# Patient Record
Sex: FEMALE | Race: ASIAN | NOT HISPANIC OR LATINO | Employment: FULL TIME | URBAN - METROPOLITAN AREA
[De-identification: names, ages, dates, MRNs, and addresses within clinical notes are randomized per-mention and may not be internally consistent; named-entity substitution may affect disease eponyms.]

---

## 2017-02-02 ENCOUNTER — OFFICE VISIT - HEALTHEAST (OUTPATIENT)
Dept: FAMILY MEDICINE | Facility: CLINIC | Age: 32
End: 2017-02-02

## 2017-02-02 DIAGNOSIS — L91.0 KELOID: ICD-10-CM

## 2017-02-02 DIAGNOSIS — Z76.89 ENCOUNTER TO ESTABLISH CARE: ICD-10-CM

## 2017-02-02 ASSESSMENT — MIFFLIN-ST. JEOR: SCORE: 1220.67

## 2017-02-03 ENCOUNTER — RECORDS - HEALTHEAST (OUTPATIENT)
Dept: ADMINISTRATIVE | Facility: OTHER | Age: 32
End: 2017-02-03

## 2017-08-30 ENCOUNTER — OFFICE VISIT - HEALTHEAST (OUTPATIENT)
Dept: FAMILY MEDICINE | Facility: CLINIC | Age: 32
End: 2017-08-30

## 2017-08-30 DIAGNOSIS — Z23 NEED FOR IMMUNIZATION AGAINST INFLUENZA: ICD-10-CM

## 2017-08-30 DIAGNOSIS — G43.109 MIGRAINE WITH AURA AND WITHOUT STATUS MIGRAINOSUS, NOT INTRACTABLE: ICD-10-CM

## 2017-08-30 ASSESSMENT — MIFFLIN-ST. JEOR: SCORE: 1218.85

## 2017-10-11 ENCOUNTER — OFFICE VISIT - HEALTHEAST (OUTPATIENT)
Dept: FAMILY MEDICINE | Facility: CLINIC | Age: 32
End: 2017-10-11

## 2017-10-11 DIAGNOSIS — Z71.84 COUNSELING FOR TRAVEL: ICD-10-CM

## 2017-10-11 DIAGNOSIS — G43.109 MIGRAINE WITH AURA AND WITHOUT STATUS MIGRAINOSUS, NOT INTRACTABLE: ICD-10-CM

## 2017-10-11 ASSESSMENT — MIFFLIN-ST. JEOR: SCORE: 1226.57

## 2017-10-20 ENCOUNTER — COMMUNICATION - HEALTHEAST (OUTPATIENT)
Dept: HEALTH INFORMATION MANAGEMENT | Facility: CLINIC | Age: 32
End: 2017-10-20

## 2017-10-20 ENCOUNTER — COMMUNICATION - HEALTHEAST (OUTPATIENT)
Dept: TELEHEALTH | Facility: CLINIC | Age: 32
End: 2017-10-20

## 2017-12-06 ENCOUNTER — OFFICE VISIT - HEALTHEAST (OUTPATIENT)
Dept: FAMILY MEDICINE | Facility: CLINIC | Age: 32
End: 2017-12-06

## 2017-12-06 ENCOUNTER — COMMUNICATION - HEALTHEAST (OUTPATIENT)
Dept: SCHEDULING | Facility: CLINIC | Age: 32
End: 2017-12-06

## 2017-12-06 DIAGNOSIS — S60.429A BLISTER OF FINGER, INITIAL ENCOUNTER: ICD-10-CM

## 2017-12-06 ASSESSMENT — MIFFLIN-ST. JEOR: SCORE: 1223.4

## 2018-02-26 ENCOUNTER — COMMUNICATION - HEALTHEAST (OUTPATIENT)
Dept: SCHEDULING | Facility: CLINIC | Age: 33
End: 2018-02-26

## 2018-10-31 ENCOUNTER — AMBULATORY - HEALTHEAST (OUTPATIENT)
Dept: NURSING | Facility: CLINIC | Age: 33
End: 2018-10-31

## 2019-05-07 ENCOUNTER — OFFICE VISIT - HEALTHEAST (OUTPATIENT)
Dept: FAMILY MEDICINE | Facility: CLINIC | Age: 34
End: 2019-05-07

## 2019-05-07 ENCOUNTER — RECORDS - HEALTHEAST (OUTPATIENT)
Dept: GENERAL RADIOLOGY | Facility: CLINIC | Age: 34
End: 2019-05-07

## 2019-05-07 DIAGNOSIS — S99.921A TOE INJURY, RIGHT, INITIAL ENCOUNTER: ICD-10-CM

## 2019-05-07 DIAGNOSIS — S99.921A UNSPECIFIED INJURY OF RIGHT FOOT, INITIAL ENCOUNTER: ICD-10-CM

## 2019-05-21 ENCOUNTER — RECORDS - HEALTHEAST (OUTPATIENT)
Dept: GENERAL RADIOLOGY | Facility: CLINIC | Age: 34
End: 2019-05-21

## 2019-05-21 ENCOUNTER — OFFICE VISIT - HEALTHEAST (OUTPATIENT)
Dept: FAMILY MEDICINE | Facility: CLINIC | Age: 34
End: 2019-05-21

## 2019-05-21 DIAGNOSIS — S99.921D UNSPECIFIED INJURY OF RIGHT FOOT, SUBSEQUENT ENCOUNTER: ICD-10-CM

## 2019-05-21 DIAGNOSIS — S99.921D INJURY OF RIGHT FOOT, SUBSEQUENT ENCOUNTER: ICD-10-CM

## 2019-12-17 ENCOUNTER — RECORDS - HEALTHEAST (OUTPATIENT)
Dept: ADMINISTRATIVE | Facility: OTHER | Age: 34
End: 2019-12-17

## 2019-12-17 ENCOUNTER — COMMUNICATION - HEALTHEAST (OUTPATIENT)
Dept: SCHEDULING | Facility: CLINIC | Age: 34
End: 2019-12-17

## 2020-09-02 ENCOUNTER — AMBULATORY - HEALTHEAST (OUTPATIENT)
Dept: NURSING | Facility: CLINIC | Age: 35
End: 2020-09-02

## 2021-05-28 NOTE — PROGRESS NOTES
Assessment:   1. Toe injury, right, initial encounter   Suspected non-displaced fracture. Recommend solid boot and buddy taping the affected toe.   - XR Toe Right 2 or More VWS: I have independently review and interpreted the  imaging, pending the final radiology read.    EXAM: XR TOE RIGHT 2 OR MORE VWS  LOCATION: [facility]  DATE/TIME: 5/7/2019 1:28 PM     INDICATION: Unspecified injury of right foot, pain.  COMPARISON: None.     FINDINGS: There is soft tissue swelling about the tip of the fourth toe. On the oblique view, there is a tiny lucency along the dorsal aspect of the distal phalanx of the DIP joint. This may reflect a nondisplaced fracture.     Plan:   Natural history and expected course discussed. Questions answered.  Home exercise plan outlined.  Rest, ice, compression, and elevation (RICE) therapy.  NSAIDs per medication orders.  OTC analgesics as needed.  Night-time foot splint prescribed.  Follow up in 2 weeks.     Subjective:   Kayley Palacios is a 33 y.o. female who presents with right foot pain. Onset of the symptoms was 2 days ago. Precipitating event: she reports that she jumped into the pool and she came out with bruised foot. Current symptoms include: ability to bear weight, but with some pain, bruising and swelling. Aggravating factors: any weight bearing. Symptoms have stabilized. Patient has had no prior foot problems. Evaluation to date: none. Treatment to date: none.    The following portions of the patient's history were reviewed and updated as appropriate: allergies, current medications, past family history, past medical history, past social history, past surgical history and problem list.    Review of Systems  Pertinent items are noted in HPI.      Objective:      /64   Pulse 92   Wt 127 lb (57.6 kg)   SpO2 100%   BMI 24.00 kg/m    Right foot:  tenderness of the 4th metatarsal head, mild swelling and erythema   Left foot:  normal exam, no swelling, tenderness,  instability; ligaments intact, full range of motion of all ankle/foot joints     Imaging:  X-ray of the right foot: soft tissue swelling, possible non-displaced fracture.

## 2021-05-29 NOTE — PROGRESS NOTES
Attestation:  I Nuris Ray DNP, performed a history and physical examination of the patient and discussed management with the NP student. I reviewed the NP student s note and agree with the documented findings and plan of care.       Assessment/Plan:        Diagnoses and all orders for this visit:    Injury of right foot, subsequent encounter  Since toe fracture can take 6 weeks to heal, recommend continuing walking boot for at least 2 more weeks.  Then use walking boot as needed for increased pain s/t to wearing regular shoes.    Repeat Xray today to evaluate status of fracture  Continue OTC analgesics as needed.     -     XR Toe Right 2 or More VWS; Future; Expected date: 05/21/2019    EXAM: XR TOE RIGHT 2 OR MORE VWS  LOCATION: [facility]  DATE/TIME: 5/21/2019 10:20 AM     INDICATION: Unspecified injury of right foot, subsequent encounter  COMPARISON: None.     FINDINGS:  There is an acute nondisplaced intra-articular fracture of the dorsal aspect of the base of the distal phalanx of the right fourth toe. Satisfactory alignment. No significant displacement of fracture fragments. No angulation.    Subjective:    Patient ID: Kayley Palacios is a 33 y.o. female.    Patient presents today for follow-up elevation of right toe fracture.  Patient states she has been wearing walking boot and has continued to buddy tape her toes.  Patient reports that the pain is better and only present when pressure is applied directly to toe.  Patient no longer has needed OTC pain mediations and denies any activity limitations. Patient denies any increased swelling or redness to affected toe.        The following portions of the patient's history were reviewed and updated as appropriate: allergies, current medications, past family history, past medical history, past social history, past surgical history and problem list.    Review of Systems   Constitutional: Negative.    HENT: Negative.    Eyes: Negative.     Respiratory: Negative.    Cardiovascular: Negative.    Endocrine: Negative.    Genitourinary: Negative.    Musculoskeletal:        Toe pain is present if pressure is applied to injured toe.    Neurological: Negative.    Hematological: Negative.    Psychiatric/Behavioral: Negative.      No current outpatient medications on file.     No current facility-administered medications for this visit.              Objective:    Physical Exam   Constitutional: She is oriented to person, place, and time. She appears well-developed and well-nourished.   HENT:   Head: Normocephalic and atraumatic.   Eyes: Pupils are equal, round, and reactive to light. Conjunctivae are normal.   Neck: Normal range of motion.   Cardiovascular: Normal rate and regular rhythm.   Pulmonary/Chest: Effort normal and breath sounds normal.   Abdominal: Soft.   Musculoskeletal: Normal range of motion.   Right foot in walking boot, toes maddi taped.  Pain present with pressure.    Neurological: She is alert and oriented to person, place, and time.   Skin: Skin is warm and dry.         /64   Pulse 91   Wt 127 lb (57.6 kg)   SpO2 100%   BMI 24.00 kg/m

## 2021-05-30 VITALS — BODY MASS INDEX: 23.63 KG/M2 | HEIGHT: 62 IN | WEIGHT: 128.4 LBS

## 2021-05-31 VITALS — BODY MASS INDEX: 23.55 KG/M2 | WEIGHT: 128 LBS | HEIGHT: 62 IN

## 2021-05-31 VITALS — BODY MASS INDEX: 25.02 KG/M2 | WEIGHT: 132.5 LBS | HEIGHT: 61 IN

## 2021-05-31 VITALS — HEIGHT: 61 IN | WEIGHT: 133.2 LBS | BODY MASS INDEX: 25.15 KG/M2

## 2021-06-03 VITALS — WEIGHT: 127 LBS | BODY MASS INDEX: 24 KG/M2

## 2021-06-03 VITALS — BODY MASS INDEX: 24 KG/M2 | WEIGHT: 127 LBS

## 2021-06-08 NOTE — PROGRESS NOTES
Office Visit - New Patient   Kayley Palacios   31 y.o.  female    Date of visit: 2/2/2017  Physician: Nuris Ray CNP     Assessment and Plan   1. Encounter to establish care  2. Keloid  Excess collagen formation around the left earlobe. I will refer patient to dermatologist for possible treatment with corticosteroids.   - Ambulatory referral to Dermatology    Follow up as needed     Chief Complaint   Chief Complaint   Patient presents with     Establish Care     Keloid     Left ear getting larger 2 years         Patient Profile   Social History     Social History Narrative        Past Medical History   Patient Active Problem List   Diagnosis     Anemia     History of postpartum hemorrhage     Family history of cystic fibrosis       Past Surgical History  She has a past surgical history that includes [place] tooth extraction.     History of Present Illness   This 31 y.o. old female patient with a previous history of Anemia. She has a 2 years old daughter and a 5 month old son. She is here today to establish care to be evaluated for a lesion on her left earlobe. She reports that she noticed the lesion before her pregnant and since she had her baby that the lesion has become bigger and she is very concerned. She reports that the lesion itch once in a while and can be uncomfortable. She denied having any other lesion any any other part of her body.    Review of Systems: A 12 point comprehensive review of systems was negative except as noted.     Medications and Allergies   Current Outpatient Prescriptions   Medication Sig Dispense Refill     OMEGA-3/DHA/EPA/FISH OIL (FISH OIL-OMEGA-3 FATTY ACIDS) 300-1,000 mg capsule Take 2 g by mouth daily.       No current facility-administered medications for this visit.      No Known Allergies     Family and Social History   Family History   Problem Relation Age of Onset     Stroke Mother      Minor stroke at 58.  Recovered from it.     Hypertension Mother      Med managed      "Depression Mother      Problem several years.  Now better post meds/therapy.     Anemia Sister      Other Maternal Grandmother      Early age of death.  Kayley's mom was age 7 when parents .   in [place], not sure exact reason, however lack of health care, proper nourishment, etc contributed.     Other Maternal Grandfather       (same, see above)     Other Paternal Grandmother       in [place], no info     Other Paternal Grandfather       in [place], no info        Social History   Substance Use Topics     Smoking status: Former Smoker     Packs/day: 0.25     Years: 10.00     Types: Cigarettes     Quit date: 2014     Smokeless tobacco: Never Used     Alcohol use No        Physical Exam   General Appearance: Alert and oriented.    Visit Vitals     /70 (Patient Site: Right Arm, Patient Position: Sitting, Cuff Size: Adult Regular)     Pulse 67     Ht 5' 2\" (1.575 m)     Wt 128 lb 6.4 oz (58.2 kg)     LMP 2017 (Approximate)     BMI 23.48 kg/m2       EYES: Eyelids, conjunctiva, and sclera were normal. Pupils were normal. Cornea, iris, and lens were normal bilaterally.  HEAD, EARS, NOSE, MOUTH, AND THROAT: Head and face were normal. Hearing was normal to voice and the ears were normal to external exam. Nose appearance was normal and there was no discharge. Oropharynx was normal.  NECK: Neck appearance was normal. There were no neck masses and the thyroid was not enlarged.  RESPIRATORY: Breathing pattern was normal and the chest moved symmetrically.    GASTROINTESTINAL: The abdomen was normal in contour.  Bowel sounds were present.  Percussion detected no organ enlargement or tenderness.  Palpation detected no tenderness, mass, or enlarged organs.   MUSCULOSKELETAL: Skeletal configuration was normal and muscle mass was normal for age. Joint appearance was overall normal.  LYMPHATIC: There were no enlarged nodes.  SKIN/HAIR/NAILS: Skin color was normal.  There was skin lesion on her left earlobe.  Hair " and nails were normal.  NEUROLOGIC: The patient was alert and oriented to person, place, time, and circumstance. Speech was normal. Cranial nerves were normal. Motor strength was normal for age. The patient was normally coordinated.  PSYCHIATRIC:  Mood and affect were normal and the patient had normal recent and remote memory. The patient's judgment and insight were normal.         Additional Information     Time: total time spent with the patient was 25 minutes of which >50% was spent in counseling and coordination of care     Nuris Ray CNP  Family Nurse Practitioner  [facility]  [phone]  [email or web address]

## 2021-06-12 NOTE — PROGRESS NOTES
Assessment:   1. Migraine with aura and without status migrainosus, not intractable  Likely related to IUD insertion.  This is the diffuse due to patient is experience.  Prior to having an IUD she never had a headaches but since she had the IUD inserted she has been having continuous headache which has not responded to either ibuprofen or Excedrin.  Informed patient to try triptan's and if this does not help she should see her OB/GYN for possible removal of IUD.  - SUMAtriptan (IMITREX) 50 MG tablet; Take 1 tablet (50 mg total) by mouth every 2 (two) hours as needed for migraine.  Dispense: 30 tablet; Refill: 2    2. Need for immunization against influenza  Complete needed immunization  - Influenza, Seasonal,Quad Inj, 36+ MOS     Plan:   Lie in darkened room and apply cold packs as needed for pain.  Episodic therapy: triptan therapy due to low frequency of pain.  Side effect profile discussed in detail.  Asked to keep headache diary.  Patient reassured that neurodiagnostic workup not indicated from benign H&P.  Follow up in 2 weeks.     Subjective:   Kayley Palacios is a 31 y.o. female who presents for evaluation of headache. Symptoms began about a year ago after she had her last tried and she had an IUD placed. Generally, the headaches last about hours to 3 days and occur most of the days when she is at work. The headaches do not seem to be related to any time of the day. The headaches are usually pounding and are located in temporal.  The patient rates her most severe headaches a 7 on a scale from 1 to 10. Recently, the headaches have been increasing in frequency. Work attendance or other daily activities are affected by the headaches. Precipitating factors include: odors. The headaches are usually preceded by an aura consisting of a peculiar odor. Associated neurologic symptoms: decreased physical activity, dizziness, vision problems and worsening school/work performance. The patient denies depression, loss of balance,  "muscle weakness, numbness of extremities and speech difficulties. Home treatment has included ibuprofen with no improvement. Other history includes: nothing pertinent. Family history includes no known family members with significant headaches.    The following portions of the patient's history were reviewed and updated as appropriate:   She  has a past medical history of Anemia; Perineal laceration (8/29/2016); and UTI (urinary tract infection).  She  does not have any pertinent problems on file.  She  has a past surgical history that includes [place] tooth extraction.  Her family history includes Anemia in her sister; Depression in her mother; Hypertension in her mother; Other in her maternal grandfather, maternal grandmother, paternal grandfather, and paternal grandmother; Stroke in her mother.  She  reports that she quit smoking about 3 years ago. Her smoking use included Cigarettes. She has a 2.50 pack-year smoking history. She has never used smokeless tobacco. She reports that she does not drink alcohol or use illicit drugs.  Current Outpatient Prescriptions   Medication Sig Dispense Refill     OMEGA-3/DHA/EPA/FISH OIL (FISH OIL-OMEGA-3 FATTY ACIDS) 300-1,000 mg capsule Take 2 g by mouth daily.       SUMAtriptan (IMITREX) 50 MG tablet Take 1 tablet (50 mg total) by mouth every 2 (two) hours as needed for migraine. 30 tablet 2     No current facility-administered medications for this visit.      Current Outpatient Prescriptions on File Prior to Visit   Medication Sig     OMEGA-3/DHA/EPA/FISH OIL (FISH OIL-OMEGA-3 FATTY ACIDS) 300-1,000 mg capsule Take 2 g by mouth daily.     No current facility-administered medications on file prior to visit.      She has No Known Allergies..    Review of Systems  A 12 point comprehensive review of systems was negative except as noted.      Objective:   /60  Pulse 68  Ht 5' 2\" (1.575 m)  Wt 128 lb (58.1 kg)  SpO2 97%  BMI 23.41 kg/m2  General appearance: alert, appears " stated age and cooperative  Head: Normocephalic, without obvious abnormality, atraumatic  Eyes: conjunctivae/corneas clear. PERRL, EOM's intact. Fundi benign.  Ears: normal TM's and external ear canals both ears  Nose: Nares normal. Septum midline. Mucosa normal. No drainage or sinus tenderness.  Throat: lips, mucosa, and tongue normal; teeth and gums normal  Pulses: 2+ and symmetric  Skin: Skin color, texture, turgor normal. No rashes or lesions  Lymph nodes: Cervical, supraclavicular, and axillary nodes normal.  Neurologic: Grossly normal

## 2021-06-13 NOTE — PROGRESS NOTES
Assessment:   1. Counseling for travel   No contraindications to travel.  - Hepatitis A adult 2 dose IM (19 yr & older)  - Typhoid, Inactive, Inj  - atovaquone-proguanil (MALARONE) 250-100 mg Tab; Take 1 tablet by mouth daily with breakfast for 10 days.  Dispense: 20 tablet; Refill: 0    2. Migraine with aura and without status migrainosus, not intractable  Nausea with migraine and with the use of statins. We will try anti-emetics with statins to help reduce nausea and dizziness  - ondansetron (ZOFRAN, AS HYDROCHLORIDE,) 4 MG tablet; Take 1 tablet (4 mg total) by mouth every 8 (eight) hours as needed for nausea.  Dispense: 30 tablet; Refill: 1     Plan:   Issues discussed: environmental concerns, freshwater swimming, malaria, safe food/water, traveler's diarrhea and what to do if ill upon return.  Immunizations recommended: Hepatitis A series and Typhoid (parenteral).  Malaria prophylaxis: malarone, daily dose starting 1-2 days before entering endemic area, ending 7 days after leaving area  Traveler's diarrhea prophylaxis: not indicated.  Total duration of visit: 25 Minutes. Total time spent on education, counseling, coordination of care: 25 Minutes.  Follow-up as needed for acute illness.     Subjective:   Kayley Palacios is a 32 y.o. female who presents to the Infectious Disease clinic for travel consultation. She has few migriane headaches since the last visit. She used the statin but dicovered that it makes her nauseous and dizzy.     Planned departure date: 11/07/17           Planned return date: 11/16/17  Countries of travel: [facility]  Areas in country: urban    Accommodations: hotel  Purpose of travel: vacation  Prior travel out of [place]: yes  Currently ill / Fever: no  History of liver or kidney disease: no    Data Review:   The following portions of the patient's history were reviewed and updated as appropriate:   She  has a past medical history of Anemia; Perineal laceration (8/29/2016); and UTI (urinary tract  infection).  She  does not have any pertinent problems on file.  She  has a past surgical history that includes [place] tooth extraction.  Her family history includes Anemia in her sister; Depression in her mother; Hypertension in her mother; Other in her maternal grandfather, maternal grandmother, paternal grandfather, and paternal grandmother; Stroke in her mother.  She  reports that she quit smoking about 3 years ago. Her smoking use included Cigarettes. She has a 2.50 pack-year smoking history. She has never used smokeless tobacco. She reports that she does not drink alcohol or use illicit drugs.  Current Outpatient Prescriptions   Medication Sig Dispense Refill     OMEGA-3/DHA/EPA/FISH OIL (FISH OIL-OMEGA-3 FATTY ACIDS) 300-1,000 mg capsule Take 2 g by mouth daily.       SUMAtriptan (IMITREX) 50 MG tablet Take 1 tablet (50 mg total) by mouth every 2 (two) hours as needed for migraine. 30 tablet 2     No current facility-administered medications for this visit.      Current Outpatient Prescriptions on File Prior to Visit   Medication Sig     OMEGA-3/DHA/EPA/FISH OIL (FISH OIL-OMEGA-3 FATTY ACIDS) 300-1,000 mg capsule Take 2 g by mouth daily.     SUMAtriptan (IMITREX) 50 MG tablet Take 1 tablet (50 mg total) by mouth every 2 (two) hours as needed for migraine.     No current facility-administered medications on file prior to visit.      She has No Known Allergies..    Review of Systems  A 12 point comprehensive review of systems was negative except as noted.      Objective:     General Appearance:    Alert, cooperative, no distress, appears stated age   Head:    Normocephalic, without obvious abnormality, atraumatic   Eyes:    PERRL, conjunctiva/corneas clear, EOM's intact, fundi     benign, both eyes   Ears:    Normal TM's and external ear canals, both ears   Nose:   Nares normal, septum midline, mucosa normal, no drainage    or sinus tenderness   Throat:   Lips, mucosa, and tongue normal; teeth and gums normal    Neck:   Supple, symmetrical, trachea midline, no adenopathy;     thyroid:  no enlargement/tenderness/nodules; no carotid    bruit or JVD   Back:     Symmetric, no curvature, ROM normal, no CVA tenderness   Lungs:     Clear to auscultation bilaterally, respirations unlabored   Chest Wall:    No tenderness or deformity    Heart:    Regular rate and rhythm, S1 and S2 normal, no murmur, rub   or gallop   Breast Exam:    No tenderness, masses, or nipple abnormality   Abdomen:     Soft, non-tender, bowel sounds active all four quadrants,     no masses, no organomegaly   Genitalia:    Normal female without lesion, discharge or tenderness   Rectal:    Normal tone, no masses or tenderness; guaiac negative stool   Extremities:   Extremities normal, atraumatic, no cyanosis or edema   Pulses:   2+ and symmetric all extremities   Skin:   Skin color, texture, turgor normal, no rashes or lesions   Lymph nodes:   Cervical, supraclavicular, and axillary nodes normal   Neurologic:   CNII-XII intact, normal strength, sensation and reflexes     throughout

## 2021-06-14 NOTE — PROGRESS NOTES
"  Office Visit:   Kayley Palacios   32 y.o. female    Date of Visit: 12/6/2017    Chief Complaint   Patient presents with     Burn     left ring finger-12/02/17        Assessment and Plan   1. Blister of finger, initial encounter  The burn site is free of infection. Continue with current care. Return to clinic with any signs of infection.        History of Present Illness   This 32 y.o. old Kayley Palacios is a 32 y.o. female seen today for complain of a burn injury involving the left index finger.  She reports that she burned her finger on a hot glue from a glue gun that happened on Sat Dec 2nd-4 days ago. The wound has now formed a yellowish blister and she is concerned that it might be infected. She denied pain and discomfort.    .     Review of Systems: A comprehensive review of systems was negative except as noted.     Medications, Allergies and Problem List   Reviewed and updated     Physical Exam   General Appearance: well groomed    /70  Pulse 83  Ht 5' 1\" (1.549 m)  Wt 132 lb 8 oz (60.1 kg)  SpO2 99%  BMI 25.04 kg/m2    Dime size blister on the index finger, no redness, no pain with palpation.      Additional Information   Current Outpatient Prescriptions   Medication Sig Dispense Refill     OMEGA-3/DHA/EPA/FISH OIL (FISH OIL-OMEGA-3 FATTY ACIDS) 300-1,000 mg capsule Take 2 g by mouth daily.       ondansetron (ZOFRAN, AS HYDROCHLORIDE,) 4 MG tablet Take 1 tablet (4 mg total) by mouth every 8 (eight) hours as needed for nausea. 30 tablet 1     SUMAtriptan (IMITREX) 50 MG tablet Take 1 tablet (50 mg total) by mouth every 2 (two) hours as needed for migraine. 30 tablet 2     No current facility-administered medications for this visit.      No Known Allergies  Social History   Substance Use Topics     Smoking status: Former Smoker     Packs/day: 0.25     Years: 10.00     Types: Cigarettes     Quit date: 2/28/2014     Smokeless tobacco: Never Used     Alcohol use No        Promise C MECCA Ray                "

## 2021-06-16 PROBLEM — G43.109 MIGRAINE WITH AURA AND WITHOUT STATUS MIGRAINOSUS, NOT INTRACTABLE: Status: ACTIVE | Noted: 2017-10-11

## 2021-08-21 ENCOUNTER — HEALTH MAINTENANCE LETTER (OUTPATIENT)
Age: 36
End: 2021-08-21

## 2021-10-16 ENCOUNTER — HEALTH MAINTENANCE LETTER (OUTPATIENT)
Age: 36
End: 2021-10-16

## 2022-06-22 ENCOUNTER — HOSPITAL ENCOUNTER (EMERGENCY)
Facility: CLINIC | Age: 37
Discharge: HOME | End: 2022-06-22
Attending: EMERGENCY MEDICINE | Admitting: EMERGENCY MEDICINE
Payer: COMMERCIAL

## 2022-06-22 ENCOUNTER — APPOINTMENT (OUTPATIENT)
Dept: ULTRASOUND IMAGING | Facility: CLINIC | Age: 37
End: 2022-06-22
Attending: EMERGENCY MEDICINE
Payer: COMMERCIAL

## 2022-06-22 VITALS
HEIGHT: 62 IN | WEIGHT: 130 LBS | HEART RATE: 91 BPM | TEMPERATURE: 98.2 F | OXYGEN SATURATION: 100 % | SYSTOLIC BLOOD PRESSURE: 134 MMHG | BODY MASS INDEX: 23.92 KG/M2 | DIASTOLIC BLOOD PRESSURE: 97 MMHG | RESPIRATION RATE: 18 BRPM

## 2022-06-22 DIAGNOSIS — B96.89 BACTERIAL VAGINOSIS: ICD-10-CM

## 2022-06-22 DIAGNOSIS — R10.84 GENERALIZED ABDOMINAL PAIN: ICD-10-CM

## 2022-06-22 DIAGNOSIS — N83.201 CYST OF RIGHT OVARY: ICD-10-CM

## 2022-06-22 DIAGNOSIS — N76.0 BACTERIAL VAGINOSIS: ICD-10-CM

## 2022-06-22 LAB
ALBUMIN SERPL-MCNC: 4.1 G/DL (ref 3.5–5)
ALBUMIN UR-MCNC: NEGATIVE MG/DL
ALP SERPL-CCNC: 58 U/L (ref 45–120)
ALT SERPL W P-5'-P-CCNC: 13 U/L (ref 0–45)
ANION GAP SERPL CALCULATED.3IONS-SCNC: 11 MMOL/L (ref 5–18)
APPEARANCE UR: CLEAR
AST SERPL W P-5'-P-CCNC: 16 U/L (ref 0–40)
BACTERIA #/AREA URNS HPF: ABNORMAL /HPF
BASOPHILS # BLD AUTO: 0 10E3/UL (ref 0–0.2)
BASOPHILS NFR BLD AUTO: 0 %
BILIRUB SERPL-MCNC: 0.6 MG/DL (ref 0–1)
BILIRUB UR QL STRIP: NEGATIVE
BUN SERPL-MCNC: 13 MG/DL (ref 8–22)
CALCIUM SERPL-MCNC: 9.4 MG/DL (ref 8.5–10.5)
CHLORIDE BLD-SCNC: 103 MMOL/L (ref 98–107)
CLUE CELLS: PRESENT
CO2 SERPL-SCNC: 24 MMOL/L (ref 22–31)
COLOR UR AUTO: COLORLESS
CREAT SERPL-MCNC: 0.73 MG/DL (ref 0.6–1.1)
EOSINOPHIL # BLD AUTO: 0.1 10E3/UL (ref 0–0.7)
EOSINOPHIL NFR BLD AUTO: 1 %
ERYTHROCYTE [DISTWIDTH] IN BLOOD BY AUTOMATED COUNT: 11.9 % (ref 10–15)
GFR SERPL CREATININE-BSD FRML MDRD: >90 ML/MIN/1.73M2
GLUCOSE BLD-MCNC: 81 MG/DL (ref 70–125)
GLUCOSE UR STRIP-MCNC: NEGATIVE MG/DL
HCG UR QL: NEGATIVE
HCT VFR BLD AUTO: 37.9 % (ref 35–47)
HGB BLD-MCNC: 12.8 G/DL (ref 11.7–15.7)
HGB UR QL STRIP: NEGATIVE
HYALINE CASTS: 1 /LPF
IMM GRANULOCYTES # BLD: 0 10E3/UL
IMM GRANULOCYTES NFR BLD: 0 %
KETONES UR STRIP-MCNC: NEGATIVE MG/DL
LEUKOCYTE ESTERASE UR QL STRIP: NEGATIVE
LIPASE SERPL-CCNC: 21 U/L (ref 0–52)
LYMPHOCYTES # BLD AUTO: 1.5 10E3/UL (ref 0.8–5.3)
LYMPHOCYTES NFR BLD AUTO: 15 %
MCH RBC QN AUTO: 31.3 PG (ref 26.5–33)
MCHC RBC AUTO-ENTMCNC: 33.8 G/DL (ref 31.5–36.5)
MCV RBC AUTO: 93 FL (ref 78–100)
MONOCYTES # BLD AUTO: 0.6 10E3/UL (ref 0–1.3)
MONOCYTES NFR BLD AUTO: 6 %
NEUTROPHILS # BLD AUTO: 7.8 10E3/UL (ref 1.6–8.3)
NEUTROPHILS NFR BLD AUTO: 78 %
NITRATE UR QL: NEGATIVE
NRBC # BLD AUTO: 0 10E3/UL
NRBC BLD AUTO-RTO: 0 /100
PH UR STRIP: 6.5 [PH] (ref 5–7)
PLATELET # BLD AUTO: 262 10E3/UL (ref 150–450)
POTASSIUM BLD-SCNC: 3.5 MMOL/L (ref 3.5–5)
PROT SERPL-MCNC: 8.4 G/DL (ref 6–8)
RBC # BLD AUTO: 4.09 10E6/UL (ref 3.8–5.2)
RBC URINE: <1 /HPF
SODIUM SERPL-SCNC: 138 MMOL/L (ref 136–145)
SP GR UR STRIP: 1.01 (ref 1–1.03)
SQUAMOUS EPITHELIAL: 2 /HPF
TRICHOMONAS, WET PREP: ABNORMAL
UROBILINOGEN UR STRIP-MCNC: <2 MG/DL
WBC # BLD AUTO: 10.1 10E3/UL (ref 4–11)
WBC URINE: 1 /HPF
WBC'S/HIGH POWER FIELD, WET PREP: ABNORMAL
YEAST, WET PREP: ABNORMAL

## 2022-06-22 PROCEDURE — 87210 SMEAR WET MOUNT SALINE/INK: CPT | Performed by: EMERGENCY MEDICINE

## 2022-06-22 PROCEDURE — 99285 EMERGENCY DEPT VISIT HI MDM: CPT | Mod: 25

## 2022-06-22 PROCEDURE — 80053 COMPREHEN METABOLIC PANEL: CPT | Performed by: EMERGENCY MEDICINE

## 2022-06-22 PROCEDURE — 83690 ASSAY OF LIPASE: CPT | Performed by: EMERGENCY MEDICINE

## 2022-06-22 PROCEDURE — 85025 COMPLETE CBC W/AUTO DIFF WBC: CPT | Performed by: EMERGENCY MEDICINE

## 2022-06-22 PROCEDURE — 81001 URINALYSIS AUTO W/SCOPE: CPT | Performed by: EMERGENCY MEDICINE

## 2022-06-22 PROCEDURE — 76705 ECHO EXAM OF ABDOMEN: CPT | Mod: XS

## 2022-06-22 PROCEDURE — 81025 URINE PREGNANCY TEST: CPT | Performed by: EMERGENCY MEDICINE

## 2022-06-22 PROCEDURE — 87591 N.GONORRHOEAE DNA AMP PROB: CPT | Performed by: EMERGENCY MEDICINE

## 2022-06-22 PROCEDURE — 76830 TRANSVAGINAL US NON-OB: CPT

## 2022-06-22 PROCEDURE — 36415 COLL VENOUS BLD VENIPUNCTURE: CPT | Performed by: EMERGENCY MEDICINE

## 2022-06-22 PROCEDURE — 84460 ALANINE AMINO (ALT) (SGPT): CPT | Performed by: EMERGENCY MEDICINE

## 2022-06-22 PROCEDURE — 87491 CHLMYD TRACH DNA AMP PROBE: CPT | Performed by: EMERGENCY MEDICINE

## 2022-06-22 RX ORDER — METRONIDAZOLE 500 MG/1
500 TABLET ORAL 2 TIMES DAILY
Qty: 20 TABLET | Refills: 0 | Status: SHIPPED | OUTPATIENT
Start: 2022-06-22 | End: 2022-07-02

## 2022-06-22 NOTE — DISCHARGE INSTRUCTIONS
Thank you for choosing [facility] for your care. It was a pleasure taking care of you today in our Emergency Department.     Please follow up with your primary care provider in the next 2-3 days. However, if you have continued worsening symptoms, please return to the emergency department.    In addition, in regards to your right ovarian cyst we discussed recommended follow up of another ultrasound in 8 to 12 weeks.

## 2022-06-22 NOTE — ED TRIAGE NOTES
Patient here with abdominal pain that began yesterday morning when she woke up, it began as lower abdominal pain, but it has moved to upper abdomen, patient took Pepto Bismol yesterday and reports having a black BM this morning.   No N/V/D.  Deborah Dexter RN.......6/22/2022 10:21 AM     Triage Assessment     Row Name 06/22/22 1020       Triage Assessment (Adult)    Airway WDL WDL       Respiratory WDL    Respiratory WDL WDL       Skin Circulation/Temperature WDL    Skin Circulation/Temperature WDL WDL       Cardiac WDL    Cardiac WDL WDL       Peripheral/Neurovascular WDL    Peripheral Neurovascular WDL WDL       Cognitive/Neuro/Behavioral WDL    Cognitive/Neuro/Behavioral WDL WDL

## 2022-06-22 NOTE — ED PROVIDER NOTES
EMERGENCY DEPARTMENT ENCOUNTER      NAME: Kayley Palacios  AGE: 36 year old female  YOB: 1985  MRN: [id number]  EVALUATION DATE & TIME: 6/22/2022 10:23 AM    PCP: No primary care provider on file.    ED PROVIDER: Keely Russell MD      Chief Complaint   Patient presents with     Abdominal Pain         FINAL IMPRESSION:  1. Generalized abdominal pain    2. Bacterial vaginosis    3. Cyst of right ovary          ED COURSE & MEDICAL DECISION MAKING:    Pertinent Labs & Imaging studies reviewed. (See chart for details)  36 year old female presents to the Emergency Department for evaluation of abdominal pain.  Urine pregnancy test was negative.  Urinalysis showed no evidence of UTI.  CBC, CMP, lipase were unremarkable.  Wet prep did show evidence of clue cells and I discussed treatment for BV with patient and the findings.  Patient is in agreement with plan.  Gonorrhea and Chlamydia test are pending.  Patient will be contacted with these findings when they are resulted.  I am not concerned at this time to treat her for Gonorrhea or Chlamydia given her history and exam for treatment. Discussed risks and benefits of US with patient. Pelvic US Showed evidence of right complex ovarian cyst.  I discussed the details with the patient.  There is no evidence of torsion.  Ultrasound of the appendix did not definitively visualize the appendix.  I discussed risks and benefits of further imaging including CT scan of the abdomen given patient still tenderness on exam and unclear etiology.  However she stated she was feeling better and would like to be discharged home at this time and will follow up in clinic for further evaluation and treatment. I feel comfortable with this plan as patient agreed to return with any new or worrisome symptoms.     Patient discharged in good condition with questions answered. She was given epic discharge instructions and follow-up recommendations. Patient will return to the ED if symptoms  worsen or she develops any of the concerning signs/symptoms as outlined in the EPIC d/c instructions. Otherwise she will follow up in clinic as recommended in the d/c instructions.         10:36 AM I met with the patient, obtained history, performed an initial exam, and discussed options and plan for diagnostics and treatment here in the ED.   1:26 PM Performed pelvic exam with female chaperone. Of note, patient care was delayed due to a Code Blue.   2:05 PM I discussed the plan for discharge with the patient, and patient is agreeable.  We discussed supportive cares at home and reasons for return to the ER including new or worsening symptoms - all questions and concerns addressed.  Patient to be discharged by RN.     At the conclusion of the encounter I discussed the results of all of the tests and the disposition. The questions were answered. The patient or family acknowledged understanding and was agreeable with the care plan.         MEDICATIONS GIVEN IN THE EMERGENCY:  Medications - No data to display    NEW PRESCRIPTIONS STARTED AT TODAY'S ER VISIT  Discharge Medication List as of 6/22/2022  2:40 PM      START taking these medications    Details   metroNIDAZOLE (FLAGYL) 500 MG tablet Take 1 tablet (500 mg) by mouth 2 times daily for 10 days, Disp-20 tablet, R-0, Local PrintEat yogurt or cottage cheese daily to prevent diarrhea that can be caused by taking this medication.                =================================================================    HPI    Patient information was obtained from: patient     Use of : N/A       Kayley Palacios is a 36 year old female with a pertinent history of anemia and migraine who presents to this ED by walk in for evaluation of abdominal pain.     Yesterday, patient awoke in the morning with lower abdominal pain. When she tried to use the bathroom in the morning, she felt a lower abdominal pressure. Abdominal pain persisted throughout the day. She also felt back  "pain yesterday but denies any today. Last night, pain got \"very bad\" and shifted to her upper abdomen. Now patient is feeling epigastric and LLQ abdominal pain. This morning, patient took Tylenol and pepto-bismol which slightly improved symptoms. After taking the pepto-bismol, patient had one episode of black stool. Denies feeling this type of abdominal pain before.     Of note, patient has had a cold for the past week, resulting in some nasal congestion and sore throat. Patient is able to eat and drink normally. She endorses testing negative for COVID-19 on 2022 via an at-home COVID-19 test. She is vaccinated x2 against COVID-19 but denies any boosters.     Patient denies history of any abdominal surgeries. She has a IUD and does not get a period. Denies any new sexual partners or concern for pregnancy or STDs. Denies any new medications or herbal supplements. Patient denies nausea, vomiting, diarrhea, dysuria, hematuria, urinary frequency, bloody stool, vaginal bleeding, ear pain, shortness of breath, chest pain, or any other complaints at this time.       REVIEW OF SYSTEMS   Review of Systems 10 point ROS negative with exception of those listed in the HPI.    PAST MEDICAL HISTORY:  History reviewed. No pertinent past medical history.    PAST SURGICAL HISTORY:  Past Surgical History:   Procedure Laterality Date     WISDOM TOOTH EXTRACTION      approx age 15           CURRENT MEDICATIONS:    metroNIDAZOLE (FLAGYL) 500 MG tablet        ALLERGIES:  No Known Allergies    FAMILY HISTORY:  Family History   Problem Relation Age of Onset     Cerebrovascular Disease Mother         Minor stroke at 58.  Recovered from it.     Hypertension Mother         Med managed     Depression Mother         Problem several years.  Now better post meds/therapy.     Anemia Sister      Other - See Comments Maternal Grandmother         Early age of death.  Kayley's mom was age 7 when parents .   in [place], not sure exact reason, " "however lack of health care, proper nourishment, etc contributed.     Other - See Comments Maternal Grandfather          (same, see above)     Other - See Comments Paternal Grandmother          in [place], no info     Other - See Comments Paternal Grandfather          in [place], no info       SOCIAL HISTORY:   Social History     Socioeconomic History     Marital status: Single     Spouse name: None     Number of children: 1     Years of education: some college     Highest education level: None   Tobacco Use     Smoking status: Former Smoker     Packs/day: 0.25     Years: 10.00     Pack years: 2.50     Types: Cigarettes, Cigarettes     Quit date: 2014     Years since quittin.3     Smokeless tobacco: Never Used   Substance and Sexual Activity     Alcohol use: No     Drug use: No     Sexual activity: Yes     Partners: Male     Birth control/protection: I.U.D.       VITALS:  BP (!) 134/97   Pulse 91   Temp 98.2  F (36.8  C) (Oral)   Resp 18   Ht 1.562 m (5' 1.5\")   Wt 59 kg (130 lb)   SpO2 100%   BMI 24.17 kg/m      PHYSICAL EXAM    General: Alert, lying on the bed in NAD  Neuro: Awake alert; moving extremities x 4 face symterical  Eyes: sclera nonicteric conjunctiva clear  Mouth: mmm  Heart: RRR  Lungs:  CTA bilaterally  Abdomen:  Soft, diffuse tenderness to palpation but worst in LLQ, no rebound; no guarding +BS  Pelvic: Normal appearing vaginal mucosa; no fluid in the vaginal vault; no cervical motion tenderness; no adenexal tenderness to palpation  Rectal:  no stool in the rectal vault, hemocult negative.  Back: No CVA tenderness  Extremities: no pedal edema  Skin:  No abdominal scar    LAB:  All pertinent labs reviewed and interpreted.  Results for orders placed or performed during the hospital encounter of 22   US Appendix Only    Impression    IMPRESSION:  1.  Partially visualized tubular structure involving the right lower quadrant possibly representing a nondilated appendix. This " appears to dive deep into the pelvis posteriorly and a definitive blind end cannot be confirmed. If this were to represent the   appendix this would be normal in caliber.    2.  Minimal amount of free fluid in the right lower quadrant. In a patient of this age group and gender, free fluid can be a physiologic finding.    3.  Given the minimal free fluid and the findings mentioned in impression #1, if there remains persistent pain and significant clinical concern for appendicitis consider further follow-up evaluation with contrast-enhanced CT of the abdomen and pelvis.       US Pelvis Cmplt w Transvag & Doppler LmtPel Duplex Limited    Impression    IMPRESSION:    1.  No torsion.  2.  3.9 cm complex right ovarian cyst. Recommend 8-12 week follow up ultrasound.      COMPLEX INDETERMINATE CYSTS:  Premenopausal:  Greater than 3 cm: 8-12 week follow up.    REFERENCE:  Management of Asymptomatic Ovarian and Other Adnexal Cysts Imaged at US: Society of Radiologists in Ultrasound Consensus Conference Statement. Radiology September 2010; [id number].    Simple Adnexal Cysts: SRU Consensus Conference Update on Follow-up and Reporting. Radiology September 2019. [phone].   UA with Microscopic reflex to Culture    Specimen: Urine, Midstream   Result Value Ref Range    Color Urine Colorless Colorless, Straw, Light Yellow, Yellow    Appearance Urine Clear Clear    Glucose Urine Negative Negative mg/dL    Bilirubin Urine Negative Negative    Ketones Urine Negative Negative mg/dL    Specific Gravity Urine 1.007 1.001 - 1.030    Blood Urine Negative Negative    pH Urine 6.5 5.0 - 7.0    Protein Albumin Urine Negative Negative mg/dL    Urobilinogen Urine <2.0 <2.0 mg/dL    Nitrite Urine Negative Negative    Leukocyte Esterase Urine Negative Negative    Bacteria Urine Few (A) None Seen /HPF    RBC Urine <1 <=2 /HPF    WBC Urine 1 <=5 /HPF    Squamous Epithelials Urine 2 (H) <=1 /HPF    Hyaline Casts Urine 1 <=2 /LPF   HCG  qualitative urine   Result Value Ref Range    hCG Urine Qualitative Negative Negative   Comprehensive metabolic panel   Result Value Ref Range    Sodium 138 136 - 145 mmol/L    Potassium 3.5 3.5 - 5.0 mmol/L    Chloride 103 98 - 107 mmol/L    Carbon Dioxide (CO2) 24 22 - 31 mmol/L    Anion Gap 11 5 - 18 mmol/L    Urea Nitrogen 13 8 - 22 mg/dL    Creatinine 0.73 0.60 - 1.10 mg/dL    Calcium 9.4 8.5 - 10.5 mg/dL    Glucose 81 70 - 125 mg/dL    Alkaline Phosphatase 58 45 - 120 U/L    AST 16 0 - 40 U/L    ALT 13 0 - 45 U/L    Protein Total 8.4 (H) 6.0 - 8.0 g/dL    Albumin 4.1 3.5 - 5.0 g/dL    Bilirubin Total 0.6 0.0 - 1.0 mg/dL    GFR Estimate >90 >60 mL/min/1.73m2   Result Value Ref Range    Lipase 21 0 - 52 U/L   CBC with platelets and differential   Result Value Ref Range    WBC Count 10.1 4.0 - 11.0 10e3/uL    RBC Count 4.09 3.80 - 5.20 10e6/uL    Hemoglobin 12.8 11.7 - 15.7 g/dL    Hematocrit 37.9 35.0 - 47.0 %    MCV 93 78 - 100 fL    MCH 31.3 26.5 - 33.0 pg    MCHC 33.8 31.5 - 36.5 g/dL    RDW 11.9 10.0 - 15.0 %    Platelet Count 262 150 - 450 10e3/uL    % Neutrophils 78 %    % Lymphocytes 15 %    % Monocytes 6 %    % Eosinophils 1 %    % Basophils 0 %    % Immature Granulocytes 0 %    NRBCs per 100 WBC 0 <1 /100    Absolute Neutrophils 7.8 1.6 - 8.3 10e3/uL    Absolute Lymphocytes 1.5 0.8 - 5.3 10e3/uL    Absolute Monocytes 0.6 0.0 - 1.3 10e3/uL    Absolute Eosinophils 0.1 0.0 - 0.7 10e3/uL    Absolute Basophils 0.0 0.0 - 0.2 10e3/uL    Absolute Immature Granulocytes 0.0 <=0.4 10e3/uL    Absolute NRBCs 0.0 10e3/uL   Wet prep    Specimen: Vagina; Swab   Result Value Ref Range    Trichomonas Absent Absent    Yeast Absent Absent    Clue Cells Present (A) Absent    WBCs/high power field 3+ (A) None    Specimen: Vagina; Swab   Result Value Ref Range    Chlamydia trachomatis Negative Negative   Neisseria gonorrhoea PCR    Specimen: Vagina; Swab   Result Value Ref Range    Neisseria gonorrhoeae Negative Negative        RADIOLOGY:  Reviewed all pertinent imaging. Please see official radiology report.  US Pelvis Cmplt w Transvag & Doppler LmtPel Duplex Limited   Final Result   IMPRESSION:     1.  No torsion.   2.  3.9 cm complex right ovarian cyst. Recommend 8-12 week follow up ultrasound.         COMPLEX INDETERMINATE CYSTS:   Premenopausal:   Greater than 3 cm: 8-12 week follow up.      REFERENCE:   Management of Asymptomatic Ovarian and Other Adnexal Cysts Imaged at US: Society of Radiologists in Ultrasound Consensus Conference Statement. Radiology September 2010; [id number].      Simple Adnexal Cysts: SRU Consensus Conference Update on Follow-up and Reporting. Radiology September 2019. [id number].      US Appendix Only   Final Result   IMPRESSION:   1.  Partially visualized tubular structure involving the right lower quadrant possibly representing a nondilated appendix. This appears to dive deep into the pelvis posteriorly and a definitive blind end cannot be confirmed. If this were to represent the    appendix this would be normal in caliber.      2.  Minimal amount of free fluid in the right lower quadrant. In a patient of this age group and gender, free fluid can be a physiologic finding.      3.  Given the minimal free fluid and the findings mentioned in impression #1, if there remains persistent pain and significant clinical concern for appendicitis consider further follow-up evaluation with contrast-enhanced CT of the abdomen and pelvis.                         I, Kassandra Bryant, am serving as a scribe to document services personally performed by Keely Russell MD, based on my observation and the provider's statements to me. I, Keely Russell MD, attest that Kassandra Bryant is acting in a scribe capacity, has observed my performance of the services and has documented them in accordance with my direction.    Keely Russell MD  [facility] EMERGENCY ROOM  [address]  [id number]  [phone]             Keely Russell MD  06/23/22 [id number]

## 2022-06-23 LAB
C TRACH DNA SPEC QL NAA+PROBE: NEGATIVE
N GONORRHOEA DNA SPEC QL NAA+PROBE: NEGATIVE

## 2022-09-03 ENCOUNTER — HEALTH MAINTENANCE LETTER (OUTPATIENT)
Age: 37
End: 2022-09-03

## 2023-09-30 ENCOUNTER — HEALTH MAINTENANCE LETTER (OUTPATIENT)
Age: 38
End: 2023-09-30

## 2024-02-17 ENCOUNTER — HEALTH MAINTENANCE LETTER (OUTPATIENT)
Age: 39
End: 2024-02-17

## 2024-11-22 NOTE — TELEPHONE ENCOUNTER
Patient calling - says she has feeling lightheaded and faint off and on since yesterday.  Was drinking a lot of water today to be sure she is hydrated.  Will stand up and will feel very woozy. This has been happening all day off and on.    No difficulty breathing.  No numbness or weakness of face, arm or leg on one side of body.  No heart palpitations.    Triaged to disposition of See Physician Within 4 Hours.  Patient intends to go to the [facility] near her home for evaluation.  Advised patient to have another adult drive.    Sandrine Tom RN  Triage Nurse Advisor    Reason for Disposition    [1] Dizziness caused by heat exposure, sudden standing, or poor fluid intake AND [2] no improvement after 2 hours of rest and fluids    Protocols used: DIZZINESS - [id number]       Imaging Studies/Medications

## 2024-11-23 ENCOUNTER — HEALTH MAINTENANCE LETTER (OUTPATIENT)
Age: 39
End: 2024-11-23